# Patient Record
Sex: MALE | Race: BLACK OR AFRICAN AMERICAN | NOT HISPANIC OR LATINO | Employment: FULL TIME | URBAN - METROPOLITAN AREA
[De-identification: names, ages, dates, MRNs, and addresses within clinical notes are randomized per-mention and may not be internally consistent; named-entity substitution may affect disease eponyms.]

---

## 2017-01-20 ENCOUNTER — LAB VISIT (OUTPATIENT)
Dept: LAB | Facility: HOSPITAL | Age: 77
End: 2017-01-20
Attending: RADIOLOGY
Payer: MEDICARE

## 2017-01-20 DIAGNOSIS — C61 CANCER OF PROSTATE WITH INTERMEDIATE RECURRENCE RISK (STAGE T2B-C OR GLEASON 7 OR PSA 10-20): ICD-10-CM

## 2017-01-20 LAB — COMPLEXED PSA SERPL-MCNC: 1.6 NG/ML

## 2017-01-20 PROCEDURE — 84153 ASSAY OF PSA TOTAL: CPT

## 2017-01-20 PROCEDURE — 36415 COLL VENOUS BLD VENIPUNCTURE: CPT

## 2017-01-27 ENCOUNTER — OFFICE VISIT (OUTPATIENT)
Dept: RADIATION ONCOLOGY | Facility: CLINIC | Age: 77
End: 2017-01-27
Payer: MEDICARE

## 2017-01-27 VITALS
RESPIRATION RATE: 16 BRPM | HEART RATE: 73 BPM | BODY MASS INDEX: 30.34 KG/M2 | WEIGHT: 193.31 LBS | DIASTOLIC BLOOD PRESSURE: 78 MMHG | SYSTOLIC BLOOD PRESSURE: 140 MMHG | HEIGHT: 67 IN

## 2017-01-27 DIAGNOSIS — C61 CANCER OF PROSTATE WITH INTERMEDIATE RECURRENCE RISK (STAGE T2B-C OR GLEASON 7 OR PSA 10-20): Primary | ICD-10-CM

## 2017-01-27 PROCEDURE — 99999 PR PBB SHADOW E&M-EST. PATIENT-LVL III: CPT | Mod: PBBFAC,,, | Performed by: RADIOLOGY

## 2017-01-27 PROCEDURE — 1160F RVW MEDS BY RX/DR IN RCRD: CPT | Mod: S$GLB,,, | Performed by: RADIOLOGY

## 2017-01-27 PROCEDURE — 3077F SYST BP >= 140 MM HG: CPT | Mod: S$GLB,,, | Performed by: RADIOLOGY

## 2017-01-27 PROCEDURE — 99212 OFFICE O/P EST SF 10 MIN: CPT | Mod: S$GLB,,, | Performed by: RADIOLOGY

## 2017-01-27 PROCEDURE — 1126F AMNT PAIN NOTED NONE PRSNT: CPT | Mod: S$GLB,,, | Performed by: RADIOLOGY

## 2017-01-27 PROCEDURE — 1157F ADVNC CARE PLAN IN RCRD: CPT | Mod: S$GLB,,, | Performed by: RADIOLOGY

## 2017-01-27 PROCEDURE — 3078F DIAST BP <80 MM HG: CPT | Mod: S$GLB,,, | Performed by: RADIOLOGY

## 2017-01-27 PROCEDURE — 1159F MED LIST DOCD IN RCRD: CPT | Mod: S$GLB,,, | Performed by: RADIOLOGY

## 2017-01-27 RX ORDER — RAMIPRIL 10 MG/1
10 CAPSULE ORAL DAILY
Refills: 11 | COMMUNITY
Start: 2016-11-05 | End: 2020-08-17

## 2017-01-27 NOTE — PROGRESS NOTES
Subjective:       Patient ID: Clement Shah is a 76 y.o. male.    Chief Complaint: Prostate Cancer (6mo f/u;psa)    HPI Comments: Mr. Shah presented with a screening PSA in July of this year returned at 10 ng/ml. The patient subsequently underwent TRUS and biopsy of his prostate on 8/22/15. Biopsies from the Lt. mid gland and Lt. apex returned positive for [place] 7 (3+4) adenocarcinoma. Metastatic work up was negative. MRI of the pelvis on 10/14/15 revealed the prostate gland measured 4.4 x 3.6 x 3.9 cm with estimated size of 26.8 ml/. Abnormal signal was noted in the peripheral zones on the Rt. and Lt. prostate lobes without evidence of extraprostatic extension. There was no evidence of seminal vesicle involvement or pelvic lymphadenopathy. We discussed a number of treatment options. The patient elected to proceed with definitive radiotherapy using IMRT.  He completed 78 Gy to the prostate gland on 1/25/16.      Today, the patient states he feels well.  No complaints of dysuria or hematuria.   Notes nocturia at twice a night  Continues on Flomax and Proscar.    Currently he is not sexually active.  Has Viagra but has not tried it.      Review of Systems   Constitutional: Negative for activity change, appetite change, diaphoresis and fatigue.   Respiratory: Negative for cough and shortness of breath.    Cardiovascular: Negative for chest pain and palpitations.   Gastrointestinal: Negative for abdominal pain, constipation and diarrhea.   Genitourinary: Negative for difficulty urinating, dysuria, frequency, hematuria and urgency.       Objective:      Physical Exam   Constitutional: He appears well-developed and well-nourished. No distress.   Genitourinary:   Genitourinary Comments: rectal - deferred       PSA - 1.6 ng/ml  Assessment:       1. Cancer of prostate with intermediate recurrence risk (stage T2b-c or [place] 7 or PSA 10-20)        Plan:       Continues to respond well to radiotherapy.  The patient has 90-  days left of Proscar he will then discontinue at that time. Discussed the use of Viagra with the patient and  his wife.  Will plan follow up in 6 months with PSA.

## 2017-07-28 ENCOUNTER — LAB VISIT (OUTPATIENT)
Dept: LAB | Facility: HOSPITAL | Age: 77
End: 2017-07-28
Attending: RADIOLOGY
Payer: MEDICARE

## 2017-07-28 DIAGNOSIS — C61 CANCER OF PROSTATE WITH INTERMEDIATE RECURRENCE RISK (STAGE T2B-C OR GLEASON 7 OR PSA 10-20): ICD-10-CM

## 2017-07-28 LAB — COMPLEXED PSA SERPL-MCNC: 0.57 NG/ML

## 2017-07-28 PROCEDURE — 36415 COLL VENOUS BLD VENIPUNCTURE: CPT

## 2017-07-28 PROCEDURE — 84153 ASSAY OF PSA TOTAL: CPT

## 2017-08-11 ENCOUNTER — OFFICE VISIT (OUTPATIENT)
Dept: RADIATION ONCOLOGY | Facility: CLINIC | Age: 77
End: 2017-08-11
Payer: MEDICARE

## 2017-08-11 VITALS
WEIGHT: 190.38 LBS | DIASTOLIC BLOOD PRESSURE: 68 MMHG | HEIGHT: 66 IN | SYSTOLIC BLOOD PRESSURE: 133 MMHG | BODY MASS INDEX: 30.59 KG/M2 | HEART RATE: 72 BPM | TEMPERATURE: 98 F

## 2017-08-11 DIAGNOSIS — C61 CANCER OF PROSTATE WITH INTERMEDIATE RECURRENCE RISK (STAGE T2B-C OR GLEASON 7 OR PSA 10-20): Primary | ICD-10-CM

## 2017-08-11 PROCEDURE — 99999 PR PBB SHADOW E&M-EST. PATIENT-LVL III: CPT | Mod: PBBFAC,,, | Performed by: RADIOLOGY

## 2017-08-11 PROCEDURE — 3075F SYST BP GE 130 - 139MM HG: CPT | Mod: S$GLB,,, | Performed by: RADIOLOGY

## 2017-08-11 PROCEDURE — 99213 OFFICE O/P EST LOW 20 MIN: CPT | Mod: S$GLB,,, | Performed by: RADIOLOGY

## 2017-08-11 PROCEDURE — 3078F DIAST BP <80 MM HG: CPT | Mod: S$GLB,,, | Performed by: RADIOLOGY

## 2017-08-11 PROCEDURE — 1159F MED LIST DOCD IN RCRD: CPT | Mod: S$GLB,,, | Performed by: RADIOLOGY

## 2017-08-11 PROCEDURE — 1126F AMNT PAIN NOTED NONE PRSNT: CPT | Mod: S$GLB,,, | Performed by: RADIOLOGY

## 2017-08-11 RX ORDER — TAMSULOSIN HYDROCHLORIDE 0.4 MG/1
1 CAPSULE ORAL DAILY
Qty: 90 CAPSULE | Refills: 3 | Status: SHIPPED | OUTPATIENT
Start: 2017-08-11 | End: 2018-07-25 | Stop reason: SDUPTHER

## 2017-08-11 NOTE — PROGRESS NOTES
Subjective:       Patient ID: Clement Shah is a 76 y.o. male.    Chief Complaint: No chief complaint on file.    This patient returns for follow up visit.     Mr. Shah presented with a screening PSA in July of this year returned at 10 ng/ml. The patient subsequently underwent TRUS and biopsy of his prostate on 8/22/15. Biopsies from the Lt. mid gland and Lt. apex returned positive for [place] 7 (3+4) adenocarcinoma. Metastatic work up was negative. Further work up was negative.  We discussed a number of treatment options. The patient elected to proceed with definitive radiotherapy using IMRT.  He completed 78 Gy to the prostate gland on 1/25/16.  Today, the patient states he feels will.  Notes occasional urinary hesitancy.  Nocturia 2 - 3 times per night.        Review of Systems   Constitutional: Negative for activity change, appetite change and fatigue.   Respiratory: Negative for cough and shortness of breath.    Cardiovascular: Negative for chest pain and palpitations.   Gastrointestinal: Negative for abdominal pain, constipation and diarrhea.   Genitourinary: Negative for difficulty urinating, dysuria, frequency and hematuria.       Objective:      Physical Exam   Constitutional: He appears well-developed and well-nourished. No distress.   Genitourinary:   Genitourinary Comments: rectal - normal tone prostate very small depressed gland  no palpable masses or induration.        PSA - 0.57 ng/ml  Assessment:       1. Cancer of prostate with intermediate recurrence risk (stage T2b-c or [place] 7 or PSA 10-20)        Plan:       Doing well, excellent response to therapy.  Plan follow up in 1 year with PSA.

## 2018-07-06 ENCOUNTER — TELEPHONE (OUTPATIENT)
Dept: RADIATION ONCOLOGY | Facility: CLINIC | Age: 78
End: 2018-07-06

## 2018-07-06 NOTE — TELEPHONE ENCOUNTER
Called to schedule follow up appointment; no answer, did not leave message because voicemail was full.

## 2018-07-25 DIAGNOSIS — C61 CANCER OF PROSTATE WITH INTERMEDIATE RECURRENCE RISK (STAGE T2B-C OR GLEASON 7 OR PSA 10-20): ICD-10-CM

## 2018-07-25 RX ORDER — TAMSULOSIN HYDROCHLORIDE 0.4 MG/1
1 CAPSULE ORAL DAILY
Qty: 90 CAPSULE | Refills: 3 | Status: SHIPPED | OUTPATIENT
Start: 2018-07-25 | End: 2020-08-17

## 2018-08-07 ENCOUNTER — LAB VISIT (OUTPATIENT)
Dept: LAB | Facility: HOSPITAL | Age: 78
End: 2018-08-07
Attending: RADIOLOGY
Payer: MEDICARE

## 2018-08-07 DIAGNOSIS — C61 CANCER OF PROSTATE WITH INTERMEDIATE RECURRENCE RISK (STAGE T2B-C OR GLEASON 7 OR PSA 10-20): ICD-10-CM

## 2018-08-07 LAB — COMPLEXED PSA SERPL-MCNC: 1.1 NG/ML

## 2018-08-07 PROCEDURE — 36415 COLL VENOUS BLD VENIPUNCTURE: CPT

## 2018-08-07 PROCEDURE — 84153 ASSAY OF PSA TOTAL: CPT

## 2018-08-13 ENCOUNTER — OFFICE VISIT (OUTPATIENT)
Dept: RADIATION ONCOLOGY | Facility: CLINIC | Age: 78
End: 2018-08-13
Payer: MEDICARE

## 2018-08-13 VITALS
DIASTOLIC BLOOD PRESSURE: 65 MMHG | HEART RATE: 84 BPM | HEIGHT: 66 IN | SYSTOLIC BLOOD PRESSURE: 131 MMHG | RESPIRATION RATE: 16 BRPM | BODY MASS INDEX: 30.14 KG/M2 | WEIGHT: 187.56 LBS

## 2018-08-13 DIAGNOSIS — C61 CANCER OF PROSTATE WITH INTERMEDIATE RECURRENCE RISK (STAGE T2B-C OR GLEASON 7 OR PSA 10-20): Primary | ICD-10-CM

## 2018-08-13 PROCEDURE — 99212 OFFICE O/P EST SF 10 MIN: CPT | Mod: S$GLB,,, | Performed by: RADIOLOGY

## 2018-08-13 PROCEDURE — 99999 PR PBB SHADOW E&M-EST. PATIENT-LVL III: CPT | Mod: PBBFAC,,, | Performed by: RADIOLOGY

## 2018-08-13 RX ORDER — CIPROFLOXACIN 500 MG/1
500 TABLET ORAL 2 TIMES DAILY
Qty: 28 TABLET | Refills: 0 | Status: SHIPPED | OUTPATIENT
Start: 2018-08-13 | End: 2018-08-27

## 2018-12-13 ENCOUNTER — LAB VISIT (OUTPATIENT)
Dept: LAB | Facility: HOSPITAL | Age: 78
End: 2018-12-13
Attending: RADIOLOGY
Payer: MEDICARE

## 2018-12-13 DIAGNOSIS — C61 CANCER OF PROSTATE WITH INTERMEDIATE RECURRENCE RISK (STAGE T2B-C OR GLEASON 7 OR PSA 10-20): ICD-10-CM

## 2018-12-13 LAB — COMPLEXED PSA SERPL-MCNC: 0.74 NG/ML

## 2018-12-13 PROCEDURE — 84153 ASSAY OF PSA TOTAL: CPT

## 2018-12-13 PROCEDURE — 36415 COLL VENOUS BLD VENIPUNCTURE: CPT

## 2019-04-29 DIAGNOSIS — C61 CANCER OF PROSTATE WITH INTERMEDIATE RECURRENCE RISK (STAGE T2B-C OR GLEASON 7 OR PSA 10-20): Primary | ICD-10-CM

## 2019-08-08 ENCOUNTER — LAB VISIT (OUTPATIENT)
Dept: LAB | Facility: HOSPITAL | Age: 79
End: 2019-08-08
Payer: MEDICARE

## 2019-08-08 DIAGNOSIS — C61 CANCER OF PROSTATE WITH INTERMEDIATE RECURRENCE RISK (STAGE T2B-C OR GLEASON 7 OR PSA 10-20): ICD-10-CM

## 2019-08-08 LAB — COMPLEXED PSA SERPL-MCNC: 0.61 NG/ML (ref 0–4)

## 2019-08-08 PROCEDURE — 36415 COLL VENOUS BLD VENIPUNCTURE: CPT

## 2019-08-08 PROCEDURE — 84153 ASSAY OF PSA TOTAL: CPT

## 2019-08-15 ENCOUNTER — OFFICE VISIT (OUTPATIENT)
Dept: RADIATION ONCOLOGY | Facility: CLINIC | Age: 79
End: 2019-08-15
Payer: MEDICARE

## 2019-08-15 VITALS
WEIGHT: 195.88 LBS | BODY MASS INDEX: 31.48 KG/M2 | DIASTOLIC BLOOD PRESSURE: 61 MMHG | HEART RATE: 82 BPM | SYSTOLIC BLOOD PRESSURE: 119 MMHG | RESPIRATION RATE: 16 BRPM | HEIGHT: 66 IN

## 2019-08-15 DIAGNOSIS — C61 CANCER OF PROSTATE WITH INTERMEDIATE RECURRENCE RISK (STAGE T2B-C OR GLEASON 7 OR PSA 10-20): Primary | ICD-10-CM

## 2019-08-15 PROCEDURE — 99212 PR OFFICE/OUTPT VISIT, EST, LEVL II, 10-19 MIN: ICD-10-PCS | Mod: S$GLB,,, | Performed by: RADIOLOGY

## 2019-08-15 PROCEDURE — 1101F PR PT FALLS ASSESS DOC 0-1 FALLS W/OUT INJ PAST YR: ICD-10-PCS | Mod: CPTII,S$GLB,, | Performed by: RADIOLOGY

## 2019-08-15 PROCEDURE — 99212 OFFICE O/P EST SF 10 MIN: CPT | Mod: S$GLB,,, | Performed by: RADIOLOGY

## 2019-08-15 PROCEDURE — 99999 PR PBB SHADOW E&M-EST. PATIENT-LVL III: CPT | Mod: PBBFAC,,, | Performed by: RADIOLOGY

## 2019-08-15 PROCEDURE — 99999 PR PBB SHADOW E&M-EST. PATIENT-LVL III: ICD-10-PCS | Mod: PBBFAC,,, | Performed by: RADIOLOGY

## 2019-08-15 PROCEDURE — 1101F PT FALLS ASSESS-DOCD LE1/YR: CPT | Mod: CPTII,S$GLB,, | Performed by: RADIOLOGY

## 2019-08-15 NOTE — PROGRESS NOTES
Subjective:       Patient ID: Clement Shah is a 78 y.o. male.    Chief Complaint: Prostate Cancer (1yr f/u;psa)    This patient presents for annual follow up visit.     Mr. Shah presented with a screening PSA in July of 2015 returned at 10 ng/ml. The patient subsequently underwent TRUS and biopsy of his prostate on 8/22/15. Biopsies from the Lt. mid gland and Lt. apex returned positive for [place] 7 (3+4) adenocarcinoma. Metastatic work up was negative. Further work up was negative.  We discussed a number of treatment options. The patient elected to proceed with definitive radiotherapy using IMRT.  He completed 78 Gy to the prostate gland on 1/25/16.  Today, the patient states he feels will.   Denies dysuria or hematuria.      Review of Systems   Constitutional: Negative for activity change, appetite change and chills.   Respiratory: Negative for cough and shortness of breath.    Cardiovascular: Negative for chest pain and palpitations.   Genitourinary: Negative for difficulty urinating, dysuria, frequency and hematuria.       Objective:      Physical Exam   Constitutional: He appears well-developed and well-nourished. No distress.   Abdominal: Soft. He exhibits no distension.   Genitourinary:   Genitourinary Comments: rectal deferred.        PSA - 0.61 ng/ml  Assessment:       1. Cancer of prostate with intermediate recurrence risk (stage T2b-c or [place] 7 or PSA 10-20)        Plan:       Doing well.  No evidence of tumor progression or recurrence.

## 2020-08-10 ENCOUNTER — LAB VISIT (OUTPATIENT)
Dept: LAB | Facility: HOSPITAL | Age: 80
End: 2020-08-10
Attending: RADIOLOGY
Payer: MEDICARE

## 2020-08-10 DIAGNOSIS — C61 CANCER OF PROSTATE WITH INTERMEDIATE RECURRENCE RISK (STAGE T2B-C OR GLEASON 7 OR PSA 10-20): ICD-10-CM

## 2020-08-10 LAB — COMPLEXED PSA SERPL-MCNC: 0.54 NG/ML (ref 0–4)

## 2020-08-10 PROCEDURE — 84153 ASSAY OF PSA TOTAL: CPT

## 2020-08-10 PROCEDURE — 36415 COLL VENOUS BLD VENIPUNCTURE: CPT

## 2020-08-17 ENCOUNTER — OFFICE VISIT (OUTPATIENT)
Dept: RADIATION ONCOLOGY | Facility: CLINIC | Age: 80
End: 2020-08-17
Payer: MEDICARE

## 2020-08-17 VITALS
RESPIRATION RATE: 16 BRPM | BODY MASS INDEX: 30.54 KG/M2 | SYSTOLIC BLOOD PRESSURE: 152 MMHG | WEIGHT: 190.06 LBS | DIASTOLIC BLOOD PRESSURE: 83 MMHG | HEART RATE: 86 BPM | HEIGHT: 66 IN | TEMPERATURE: 99 F

## 2020-08-17 DIAGNOSIS — C61 CANCER OF PROSTATE WITH INTERMEDIATE RECURRENCE RISK (STAGE T2B-C OR GLEASON 7 OR PSA 10-20): Primary | ICD-10-CM

## 2020-08-17 PROCEDURE — 1101F PT FALLS ASSESS-DOCD LE1/YR: CPT | Mod: CPTII,S$GLB,, | Performed by: RADIOLOGY

## 2020-08-17 PROCEDURE — 1101F PR PT FALLS ASSESS DOC 0-1 FALLS W/OUT INJ PAST YR: ICD-10-PCS | Mod: CPTII,S$GLB,, | Performed by: RADIOLOGY

## 2020-08-17 PROCEDURE — 99999 PR PBB SHADOW E&M-EST. PATIENT-LVL III: CPT | Mod: PBBFAC,,, | Performed by: RADIOLOGY

## 2020-08-17 PROCEDURE — 1159F PR MEDICATION LIST DOCUMENTED IN MEDICAL RECORD: ICD-10-PCS | Mod: S$GLB,,, | Performed by: RADIOLOGY

## 2020-08-17 PROCEDURE — 1159F MED LIST DOCD IN RCRD: CPT | Mod: S$GLB,,, | Performed by: RADIOLOGY

## 2020-08-17 PROCEDURE — 99212 PR OFFICE/OUTPT VISIT, EST, LEVL II, 10-19 MIN: ICD-10-PCS | Mod: S$GLB,,, | Performed by: RADIOLOGY

## 2020-08-17 PROCEDURE — 99212 OFFICE O/P EST SF 10 MIN: CPT | Mod: S$GLB,,, | Performed by: RADIOLOGY

## 2020-08-17 PROCEDURE — 1126F PR PAIN SEVERITY QUANTIFIED, NO PAIN PRESENT: ICD-10-PCS | Mod: S$GLB,,, | Performed by: RADIOLOGY

## 2020-08-17 PROCEDURE — 1126F AMNT PAIN NOTED NONE PRSNT: CPT | Mod: S$GLB,,, | Performed by: RADIOLOGY

## 2020-08-17 PROCEDURE — 99999 PR PBB SHADOW E&M-EST. PATIENT-LVL III: ICD-10-PCS | Mod: PBBFAC,,, | Performed by: RADIOLOGY

## 2020-08-17 RX ORDER — CHLORTHALIDONE 25 MG/1
25 TABLET ORAL
COMMUNITY

## 2020-08-17 RX ORDER — PARICALCITOL 1 UG/1
1 CAPSULE, LIQUID FILLED ORAL
COMMUNITY

## 2020-08-17 RX ORDER — LABETALOL 300 MG/1
300 TABLET, FILM COATED ORAL
COMMUNITY

## 2020-08-17 RX ORDER — NIFEDIPINE 60 MG/1
60 TABLET, EXTENDED RELEASE ORAL
COMMUNITY

## 2020-08-17 RX ORDER — RAMIPRIL 5 MG/1
5 CAPSULE ORAL
COMMUNITY

## 2020-08-17 NOTE — PROGRESS NOTES
Subjective:       Patient ID: Clement Shah is a 79 y.o. male.    Chief Complaint: Prostate Cancer (1yr f/u;psa)    This patient returns for his annual follow up visit.     Mr. Shah presented with a screening PSA in July of 2015 returned at 10 ng/ml. The patient subsequently underwent TRUS and biopsy of his prostate on 8/22/15. Biopsies from the Lt. mid gland and Lt. apex returned positive for [place] 7 (3+4) adenocarcinoma. Metastatic work up was negative. Further work up was negative.  We discussed a number of treatment options. The patient elected to proceed with definitive radiotherapy using IMRT.  He completed 78 Gy to the prostate gland on 1/25/16.  The patient has remained AURELIO since that time.  Today the patient states he feels well.  No  complaints.      Review of Systems   Constitutional: Negative for activity change, appetite change, chills and fatigue.   Gastrointestinal: Negative for abdominal pain, constipation and diarrhea.   Genitourinary: Negative for difficulty urinating, dysuria and hematuria.         Objective:      Physical Exam  Constitutional:       Appearance: Normal appearance.   Abdominal:      General: Abdomen is flat. There is no distension.   Neurological:      Mental Status: He is alert and oriented to person, place, and time.   Psychiatric:         Mood and Affect: Mood normal.         Judgment: Judgment normal.       PSA - 0.54 ng/ml.  Assessment:        History of prostate cancer    Plan:       Doing well, no evidence of tumor progression or recurrence.  Plan follow up in 1 year with PSA.

## 2021-01-10 ENCOUNTER — IMMUNIZATION (OUTPATIENT)
Dept: INTERNAL MEDICINE | Facility: CLINIC | Age: 81
End: 2021-01-10
Payer: MEDICARE

## 2021-01-10 DIAGNOSIS — Z23 NEED FOR VACCINATION: ICD-10-CM

## 2021-01-10 PROCEDURE — 91300 COVID-19, MRNA, LNP-S, PF, 30 MCG/0.3 ML DOSE VACCINE: CPT | Mod: PBBFAC | Performed by: NURSE PRACTITIONER

## 2021-01-31 ENCOUNTER — IMMUNIZATION (OUTPATIENT)
Dept: INTERNAL MEDICINE | Facility: CLINIC | Age: 81
End: 2021-01-31
Payer: MEDICARE

## 2021-01-31 DIAGNOSIS — Z23 NEED FOR VACCINATION: Primary | ICD-10-CM

## 2021-01-31 PROCEDURE — 0002A PR IMMUNIZ ADMIN, SARS-COV-2 COVID-19 VACC, 30MCG/0.3ML, 2ND DOSE: CPT | Mod: PBBFAC | Performed by: INTERNAL MEDICINE

## 2021-01-31 PROCEDURE — 91300 PR SARS-COV- 2 COVID-19 VACCINE, NO PRSV, 30MCG/0.3ML, IM: CPT | Mod: PBBFAC | Performed by: INTERNAL MEDICINE

## 2021-01-31 RX ADMIN — RNA INGREDIENT BNT-162B2 0.3 ML: 0.23 INJECTION, SUSPENSION INTRAMUSCULAR at 12:01

## 2021-08-10 ENCOUNTER — LAB VISIT (OUTPATIENT)
Dept: LAB | Facility: HOSPITAL | Age: 81
End: 2021-08-10
Attending: RADIOLOGY
Payer: MEDICARE

## 2021-08-10 DIAGNOSIS — C61 CANCER OF PROSTATE WITH INTERMEDIATE RECURRENCE RISK (STAGE T2B-C OR GLEASON 7 OR PSA 10-20): ICD-10-CM

## 2021-08-10 LAB — COMPLEXED PSA SERPL-MCNC: 0.57 NG/ML (ref 0–4)

## 2021-08-10 PROCEDURE — 36415 COLL VENOUS BLD VENIPUNCTURE: CPT | Performed by: RADIOLOGY

## 2021-08-10 PROCEDURE — 84153 ASSAY OF PSA TOTAL: CPT | Performed by: RADIOLOGY

## 2021-08-17 ENCOUNTER — OFFICE VISIT (OUTPATIENT)
Dept: RADIATION ONCOLOGY | Facility: CLINIC | Age: 81
End: 2021-08-17
Payer: MEDICARE

## 2021-08-17 VITALS
TEMPERATURE: 98 F | RESPIRATION RATE: 17 BRPM | DIASTOLIC BLOOD PRESSURE: 81 MMHG | WEIGHT: 189 LBS | BODY MASS INDEX: 30.37 KG/M2 | HEART RATE: 98 BPM | HEIGHT: 66 IN | OXYGEN SATURATION: 99 % | SYSTOLIC BLOOD PRESSURE: 159 MMHG

## 2021-08-17 DIAGNOSIS — C61 CANCER OF PROSTATE WITH INTERMEDIATE RECURRENCE RISK (STAGE T2B-C OR GLEASON 7 OR PSA 10-20): Primary | ICD-10-CM

## 2021-08-17 PROCEDURE — 1101F PR PT FALLS ASSESS DOC 0-1 FALLS W/OUT INJ PAST YR: ICD-10-PCS | Mod: CPTII,S$GLB,, | Performed by: RADIOLOGY

## 2021-08-17 PROCEDURE — 3079F DIAST BP 80-89 MM HG: CPT | Mod: CPTII,S$GLB,, | Performed by: RADIOLOGY

## 2021-08-17 PROCEDURE — 1126F PR PAIN SEVERITY QUANTIFIED, NO PAIN PRESENT: ICD-10-PCS | Mod: CPTII,S$GLB,, | Performed by: RADIOLOGY

## 2021-08-17 PROCEDURE — 3077F SYST BP >= 140 MM HG: CPT | Mod: CPTII,S$GLB,, | Performed by: RADIOLOGY

## 2021-08-17 PROCEDURE — 1160F PR REVIEW ALL MEDS BY PRESCRIBER/CLIN PHARMACIST DOCUMENTED: ICD-10-PCS | Mod: CPTII,S$GLB,, | Performed by: RADIOLOGY

## 2021-08-17 PROCEDURE — 1159F PR MEDICATION LIST DOCUMENTED IN MEDICAL RECORD: ICD-10-PCS | Mod: CPTII,S$GLB,, | Performed by: RADIOLOGY

## 2021-08-17 PROCEDURE — 3077F PR MOST RECENT SYSTOLIC BLOOD PRESSURE >= 140 MM HG: ICD-10-PCS | Mod: CPTII,S$GLB,, | Performed by: RADIOLOGY

## 2021-08-17 PROCEDURE — 3288F PR FALLS RISK ASSESSMENT DOCUMENTED: ICD-10-PCS | Mod: CPTII,S$GLB,, | Performed by: RADIOLOGY

## 2021-08-17 PROCEDURE — 1126F AMNT PAIN NOTED NONE PRSNT: CPT | Mod: CPTII,S$GLB,, | Performed by: RADIOLOGY

## 2021-08-17 PROCEDURE — 1160F RVW MEDS BY RX/DR IN RCRD: CPT | Mod: CPTII,S$GLB,, | Performed by: RADIOLOGY

## 2021-08-17 PROCEDURE — 3288F FALL RISK ASSESSMENT DOCD: CPT | Mod: CPTII,S$GLB,, | Performed by: RADIOLOGY

## 2021-08-17 PROCEDURE — 1159F MED LIST DOCD IN RCRD: CPT | Mod: CPTII,S$GLB,, | Performed by: RADIOLOGY

## 2021-08-17 PROCEDURE — 99999 PR PBB SHADOW E&M-EST. PATIENT-LVL IV: ICD-10-PCS | Mod: PBBFAC,,, | Performed by: RADIOLOGY

## 2021-08-17 PROCEDURE — 99212 OFFICE O/P EST SF 10 MIN: CPT | Mod: S$GLB,,, | Performed by: RADIOLOGY

## 2021-08-17 PROCEDURE — 3079F PR MOST RECENT DIASTOLIC BLOOD PRESSURE 80-89 MM HG: ICD-10-PCS | Mod: CPTII,S$GLB,, | Performed by: RADIOLOGY

## 2021-08-17 PROCEDURE — 1101F PT FALLS ASSESS-DOCD LE1/YR: CPT | Mod: CPTII,S$GLB,, | Performed by: RADIOLOGY

## 2021-08-17 PROCEDURE — 99999 PR PBB SHADOW E&M-EST. PATIENT-LVL IV: CPT | Mod: PBBFAC,,, | Performed by: RADIOLOGY

## 2021-08-17 PROCEDURE — 99212 PR OFFICE/OUTPT VISIT, EST, LEVL II, 10-19 MIN: ICD-10-PCS | Mod: S$GLB,,, | Performed by: RADIOLOGY

## 2021-10-06 ENCOUNTER — IMMUNIZATION (OUTPATIENT)
Dept: INTERNAL MEDICINE | Facility: CLINIC | Age: 81
End: 2021-10-06
Payer: MEDICARE

## 2021-10-06 DIAGNOSIS — Z23 NEED FOR VACCINATION: Primary | ICD-10-CM

## 2021-10-06 PROCEDURE — 91300 COVID-19, MRNA, LNP-S, PF, 30 MCG/0.3 ML DOSE VACCINE: CPT | Mod: PBBFAC | Performed by: INTERNAL MEDICINE

## 2021-10-06 PROCEDURE — 0003A COVID-19, MRNA, LNP-S, PF, 30 MCG/0.3 ML DOSE VACCINE: CPT | Mod: CV19,PBBFAC | Performed by: INTERNAL MEDICINE
